# Patient Record
Sex: FEMALE | Race: WHITE | NOT HISPANIC OR LATINO | URBAN - METROPOLITAN AREA
[De-identification: names, ages, dates, MRNs, and addresses within clinical notes are randomized per-mention and may not be internally consistent; named-entity substitution may affect disease eponyms.]

---

## 2020-09-03 ENCOUNTER — LAB OUTSIDE AN ENCOUNTER (OUTPATIENT)
Dept: URBAN - METROPOLITAN AREA CLINIC 115 | Facility: CLINIC | Age: 30
End: 2020-09-03

## 2020-09-03 ENCOUNTER — OFFICE VISIT (OUTPATIENT)
Dept: URBAN - METROPOLITAN AREA CLINIC 115 | Facility: CLINIC | Age: 30
End: 2020-09-03
Payer: COMMERCIAL

## 2020-09-03 ENCOUNTER — OFFICE VISIT (OUTPATIENT)
Dept: URBAN - METROPOLITAN AREA CLINIC 80 | Facility: CLINIC | Age: 30
End: 2020-09-03

## 2020-09-03 ENCOUNTER — DASHBOARD ENCOUNTERS (OUTPATIENT)
Age: 30
End: 2020-09-03

## 2020-09-03 DIAGNOSIS — R63.4 ABNORMAL INTENTIONAL WEIGHT LOSS: ICD-10-CM

## 2020-09-03 DIAGNOSIS — K62.5 RECTAL BLEEDING: ICD-10-CM

## 2020-09-03 DIAGNOSIS — K59.00 DYSCHEZIA: ICD-10-CM

## 2020-09-03 PROBLEM — 430237002: Status: ACTIVE | Noted: 2020-09-03

## 2020-09-03 PROBLEM — 12063002: Status: ACTIVE | Noted: 2020-09-03

## 2020-09-03 PROBLEM — 225595004: Status: ACTIVE | Noted: 2020-09-03

## 2020-09-03 PROCEDURE — G9903 PT SCRN TBCO ID AS NON USER: HCPCS | Performed by: INTERNAL MEDICINE

## 2020-09-03 PROCEDURE — 99203 OFFICE O/P NEW LOW 30 MIN: CPT | Performed by: INTERNAL MEDICINE

## 2020-09-03 PROCEDURE — G8427 DOCREV CUR MEDS BY ELIG CLIN: HCPCS | Performed by: INTERNAL MEDICINE

## 2020-09-03 PROCEDURE — G8420 CALC BMI NORM PARAMETERS: HCPCS | Performed by: INTERNAL MEDICINE

## 2020-09-03 RX ORDER — HYDROCORTISONE ACETATE, PRAMOXINE HCL 2.5; 1 G/100G; G/100G
1 APPLICATION AS NEEDED CREAM TOPICAL THREE TIMES A DAY
Qty: 1 CARTRIDGE | Refills: 1 | OUTPATIENT
Start: 2020-09-03 | End: 2020-10-01

## 2020-09-03 RX ORDER — LIDOCAINE HYDROCHLORIDE 20 MG/ML
1 APPLICATION AS NEEDED INJECTION, SOLUTION INFILTRATION; PERINEURAL THREE TIMES A DAY
Qty: 1 CARTRIDGE | Refills: 1 | OUTPATIENT
Start: 2020-09-03

## 2020-09-03 NOTE — PHYSICAL EXAM GASTROINTESTINAL
normal bowel sounds , no masses palpable , no guarding or rigidity , soft, nontender, nondistended Rectal exam with no blood, severe pain unable to complete it, abnormal protuding tissue no consistent with an hemorroid, unable to eval for internal masses, no stools.

## 2020-09-03 NOTE — HPI-TODAY'S VISIT:
Pleasant 31 y/o White Female  with no significant pmhx who was recent evaluated at local urgent care for UTI symptoms and referred to GI for rectal bleeding. Patient refer pain with every bowel movement and redblood for the past 3 months, no similar episodes before, also  ~10 pounds unintentional weight loss. She start using topical creams prescribed by Urgent care MD lidocaine and pramoxine without significant relieved on the rectal bleeding. , no ilicit drugs. Family hx of celiac on sister, no hx of CRC or IBD in the family. Using NSAID's 600mg on a daily basis for the rectal pain.

## 2020-09-03 NOTE — PHYSICAL EXAM CONSTITUTIONAL:
chaperone present in room , pleasant, well nourished, in no acute distress , comfortable , calm and relaxed , cooperative

## 2020-09-04 LAB
BASO (ABSOLUTE): 0
BASOS: 0
BUN/CREATININE RATIO: 15
BUN: 11
CARBON DIOXIDE, TOTAL: 23
CEA: 0.9
CHLORIDE: 103
CREATININE: 0.73
EGFR IF AFRICN AM: 128
EGFR IF NONAFRICN AM: 111
EOS (ABSOLUTE): 0.1
EOS: 1
GLUCOSE: 79
HEMATOCRIT: 43.8
HEMATOLOGY COMMENTS:: (no result)
HEMOGLOBIN: 13.7
IMMATURE CELLS: (no result)
IMMATURE GRANS (ABS): 0
IMMATURE GRANULOCYTES: 0
LYMPHS (ABSOLUTE): 1.7
LYMPHS: 29
MCH: 28.3
MCHC: 31.3
MCV: 91
MONOCYTES(ABSOLUTE): 0.5
MONOCYTES: 8
NEUTROPHILS (ABSOLUTE): 3.7
NEUTROPHILS: 62
NRBC: (no result)
PLATELETS: 270
POTASSIUM: 4.7
RBC: 4.84
RDW: 12
SODIUM: 142
WBC: 5.9

## 2020-09-14 ENCOUNTER — TELEPHONE ENCOUNTER (OUTPATIENT)
Dept: URBAN - METROPOLITAN AREA CLINIC 115 | Facility: CLINIC | Age: 30
End: 2020-09-14

## 2020-09-15 ENCOUNTER — WEB ENCOUNTER (OUTPATIENT)
Dept: URBAN - METROPOLITAN AREA CLINIC 115 | Facility: CLINIC | Age: 30
End: 2020-09-15

## 2020-09-25 ENCOUNTER — OFFICE VISIT (OUTPATIENT)
Dept: URBAN - METROPOLITAN AREA SURGERY CENTER 13 | Facility: SURGERY CENTER | Age: 30
End: 2020-09-25